# Patient Record
Sex: FEMALE | URBAN - METROPOLITAN AREA
[De-identification: names, ages, dates, MRNs, and addresses within clinical notes are randomized per-mention and may not be internally consistent; named-entity substitution may affect disease eponyms.]

---

## 2020-01-14 ENCOUNTER — APPOINTMENT (RX ONLY)
Dept: URBAN - METROPOLITAN AREA CLINIC 38 | Facility: CLINIC | Age: 40
Setting detail: DERMATOLOGY
End: 2020-01-14

## 2020-01-14 DIAGNOSIS — A51.39 OTHER SECONDARY SYPHILIS OF SKIN: ICD-10-CM

## 2020-01-14 PROCEDURE — 99202 OFFICE O/P NEW SF 15 MIN: CPT

## 2020-01-14 NOTE — HPI: RASH
Is This A New Presentation, Or A Follow-Up?: Rash
Additional History: States it is also causing hair loss.  \\nPt is positive for syphilis.  Finished the penicillin injections on Friday.

## 2020-09-08 ENCOUNTER — APPOINTMENT (RX ONLY)
Dept: URBAN - METROPOLITAN AREA CLINIC 38 | Facility: CLINIC | Age: 40
Setting detail: DERMATOLOGY
End: 2020-09-08

## 2020-09-08 DIAGNOSIS — A51.39 OTHER SECONDARY SYPHILIS OF SKIN: ICD-10-CM

## 2020-09-08 PROCEDURE — ? PRESCRIPTION

## 2020-09-08 PROCEDURE — 99213 OFFICE O/P EST LOW 20 MIN: CPT

## 2020-09-08 PROCEDURE — ? ADDITIONAL NOTES

## 2020-09-08 PROCEDURE — ? COUNSELING

## 2020-09-08 RX ORDER — HYDROCORTISONE 25 MG/G
CREAM TOPICAL
Qty: 1 | Refills: 4 | Status: ERX | COMMUNITY
Start: 2020-09-08

## 2020-09-08 RX ORDER — FLUOCINONIDE 0.5 MG/ML
SOLUTION TOPICAL
Qty: 1 | Refills: 2 | Status: ERX | COMMUNITY
Start: 2020-09-08

## 2020-09-08 RX ORDER — FLUOCINONIDE 0.5 MG/G
OINTMENT TOPICAL
Qty: 1 | Refills: 2 | Status: ERX | COMMUNITY
Start: 2020-09-08

## 2020-09-08 RX ADMIN — HYDROCORTISONE: 25 CREAM TOPICAL at 00:00

## 2020-09-08 RX ADMIN — FLUOCINONIDE: 0.5 OINTMENT TOPICAL at 00:00

## 2020-09-08 RX ADMIN — FLUOCINONIDE: 0.5 SOLUTION TOPICAL at 00:00

## 2020-09-08 ASSESSMENT — LOCATION SIMPLE DESCRIPTION DERM
LOCATION SIMPLE: ABDOMEN
LOCATION SIMPLE: GLABELLA
LOCATION SIMPLE: POSTERIOR NECK

## 2020-09-08 ASSESSMENT — LOCATION DETAILED DESCRIPTION DERM
LOCATION DETAILED: PERIUMBILICAL SKIN
LOCATION DETAILED: GLABELLA
LOCATION DETAILED: MID POSTERIOR NECK

## 2020-09-08 ASSESSMENT — LOCATION ZONE DERM
LOCATION ZONE: FACE
LOCATION ZONE: NECK
LOCATION ZONE: TRUNK

## 2021-05-03 ENCOUNTER — APPOINTMENT (RX ONLY)
Dept: URBAN - METROPOLITAN AREA CLINIC 38 | Facility: CLINIC | Age: 41
Setting detail: DERMATOLOGY
End: 2021-05-03

## 2021-05-03 DIAGNOSIS — L40.0 PSORIASIS VULGARIS: ICD-10-CM

## 2021-05-03 DIAGNOSIS — L30.9 DERMATITIS, UNSPECIFIED: ICD-10-CM

## 2021-05-03 PROCEDURE — ? OTHER

## 2021-05-03 PROCEDURE — ? PRESCRIPTION

## 2021-05-03 PROCEDURE — 99213 OFFICE O/P EST LOW 20 MIN: CPT | Mod: 25

## 2021-05-03 PROCEDURE — ? BIOPSY BY SHAVE METHOD

## 2021-05-03 PROCEDURE — 11102 TANGNTL BX SKIN SINGLE LES: CPT

## 2021-05-03 PROCEDURE — ? ORDER TESTS

## 2021-05-03 RX ORDER — FLUTICASONE PROPIONATE 0.5 MG/G
CREAM TOPICAL
Qty: 60 | Refills: 1 | Status: ERX | COMMUNITY
Start: 2021-05-03

## 2021-05-03 RX ADMIN — FLUTICASONE PROPIONATE: 0.5 CREAM TOPICAL at 00:00

## 2021-05-03 ASSESSMENT — LOCATION DETAILED DESCRIPTION DERM: LOCATION DETAILED: PERIUMBILICAL SKIN

## 2021-05-03 ASSESSMENT — LOCATION ZONE DERM: LOCATION ZONE: TRUNK

## 2021-05-03 ASSESSMENT — LOCATION SIMPLE DESCRIPTION DERM: LOCATION SIMPLE: ABDOMEN

## 2021-05-03 NOTE — PROCEDURE: ORDER TESTS
Billing Type: Third-Party Bill
Expected Date Of Service: 05/03/2021
Performing Laboratory: -[id number]
Bill For Surgical Tray: no

## 2021-05-03 NOTE — PROCEDURE: OTHER
Other (Free Text): Differential diagnosis lupus, sarcoid, psoriasis
Render Risk Assessment In Note?: no
Detail Level: Detailed
Note Text (......Xxx Chief Complaint.): This diagnosis correlates with the

## 2021-05-03 NOTE — PROCEDURE: BIOPSY BY SHAVE METHOD

## 2021-05-25 ENCOUNTER — APPOINTMENT (RX ONLY)
Dept: URBAN - METROPOLITAN AREA CLINIC 38 | Facility: CLINIC | Age: 41
Setting detail: DERMATOLOGY
End: 2021-05-25

## 2021-05-25 DIAGNOSIS — D86.3 SARCOIDOSIS OF SKIN: ICD-10-CM

## 2021-05-25 PROCEDURE — ? ADDITIONAL NOTES

## 2021-05-25 PROCEDURE — 99213 OFFICE O/P EST LOW 20 MIN: CPT

## 2021-05-25 PROCEDURE — ? TREATMENT REGIMEN

## 2021-05-25 PROCEDURE — ? COUNSELING

## 2021-12-07 ENCOUNTER — APPOINTMENT (RX ONLY)
Dept: URBAN - METROPOLITAN AREA CLINIC 34 | Facility: CLINIC | Age: 41
Setting detail: DERMATOLOGY
End: 2021-12-07

## 2021-12-07 DIAGNOSIS — D86.3 SARCOIDOSIS OF SKIN: ICD-10-CM | Status: WORSENING

## 2021-12-07 PROCEDURE — ? COUNSELING

## 2021-12-07 PROCEDURE — 99214 OFFICE O/P EST MOD 30 MIN: CPT

## 2021-12-07 PROCEDURE — ? MEDICATION COUNSELING

## 2021-12-07 PROCEDURE — ? ADDITIONAL NOTES

## 2021-12-07 PROCEDURE — ? ORDER TESTS

## 2021-12-07 PROCEDURE — ? PRESCRIPTION

## 2021-12-07 PROCEDURE — ? TREATMENT REGIMEN

## 2021-12-07 RX ORDER — DESONIDE 0.5 MG/G
OINTMENT TOPICAL
Qty: 60 | Refills: 2 | Status: ERX | COMMUNITY
Start: 2021-12-07

## 2021-12-07 RX ORDER — TRIAMCINOLONE ACETONIDE 1 MG/G
OINTMENT TOPICAL
Qty: 454 | Refills: 2 | Status: ERX | COMMUNITY
Start: 2021-12-07

## 2021-12-07 RX ORDER — PREDNISONE 20 MG/1
TABLET ORAL
Qty: 42 | Refills: 0 | Status: ERX | COMMUNITY
Start: 2021-12-07

## 2021-12-07 RX ADMIN — DESONIDE: 0.5 OINTMENT TOPICAL at 00:00

## 2021-12-07 RX ADMIN — PREDNISONE: 20 TABLET ORAL at 00:00

## 2021-12-07 RX ADMIN — TRIAMCINOLONE ACETONIDE: 1 OINTMENT TOPICAL at 00:00

## 2021-12-07 ASSESSMENT — LOCATION DETAILED DESCRIPTION DERM
LOCATION DETAILED: LEFT INFERIOR FOREHEAD
LOCATION DETAILED: RIGHT INFERIOR FOREHEAD
LOCATION DETAILED: RIGHT POSTERIOR SHOULDER
LOCATION DETAILED: LEFT LATERAL ABDOMEN
LOCATION DETAILED: LEFT SUPERIOR PARIETAL SCALP
LOCATION DETAILED: GLABELLA
LOCATION DETAILED: RIGHT LATERAL ABDOMEN
LOCATION DETAILED: MID-FRONTAL SCALP
LOCATION DETAILED: PERIUMBILICAL SKIN
LOCATION DETAILED: LEFT SUPRAPUBIC SKIN
LOCATION DETAILED: LEFT LATERAL SUPERIOR CHEST
LOCATION DETAILED: RIGHT SUPERIOR FOREHEAD
LOCATION DETAILED: LEFT SUPERIOR UPPER BACK
LOCATION DETAILED: RIGHT CENTRAL PARIETAL SCALP

## 2021-12-07 ASSESSMENT — LOCATION SIMPLE DESCRIPTION DERM
LOCATION SIMPLE: GROIN
LOCATION SIMPLE: LEFT UPPER BACK
LOCATION SIMPLE: RIGHT FOREHEAD
LOCATION SIMPLE: ABDOMEN
LOCATION SIMPLE: SCALP
LOCATION SIMPLE: ANTERIOR SCALP
LOCATION SIMPLE: RIGHT SHOULDER
LOCATION SIMPLE: GLABELLA
LOCATION SIMPLE: LEFT FOREHEAD
LOCATION SIMPLE: CHEST

## 2021-12-07 ASSESSMENT — LOCATION ZONE DERM
LOCATION ZONE: FACE
LOCATION ZONE: TRUNK
LOCATION ZONE: ARM
LOCATION ZONE: SCALP

## 2021-12-07 NOTE — PROCEDURE: ORDER TESTS
Bill For Surgical Tray: no
Billing Type: Third-Party Bill
Expected Date Of Service: 12/07/2021
Performing Laboratory: -[id number]

## 2021-12-07 NOTE — PROCEDURE: TREATMENT REGIMEN
Initiate Treatment: Desonide Ointment BID for face\\nTriamcinolone ointment BID for scalp and body\\nPrednisone 40 mg daily x 2 weeks, then 20 mg daily for an additional two weeks.
Detail Level: Zone
Plan: See a Pulmonologist to rule out lung involvement.  Screening CXR was negative.  ROS normal. \\n\\nBiopsy proven cutaneous sarcoidosis as of May 2021.  Was lost to follow up. \\n\\nSee primary doctor to make them aware. \\n\\nDiscussed steroid side effects and need for long term treatment. \\n\\nBaseline labs. \\n\\nCounseled patient on Condition and expectations.  Severe scalp involvement is noted. \\n\\nHas involvement inside of several tattoos which is common. \\n\\nConsider Plaquenil or other off label oral treatment to taper to

## 2021-12-07 NOTE — PROCEDURE: MEDICATION COUNSELING
DISPLAY PLAN FREE TEXT Azithromycin Counseling:  I discussed with the patient the risks of azithromycin including but not limited to GI upset, allergic reaction, drug rash, diarrhea, and yeast infections.

## 2021-12-07 NOTE — PROCEDURE: ADDITIONAL NOTES
Additional Notes: Chest X-ray normal; done in [place].  Did not ever see pulmonologist.  No family or personal hx of sarcoidosis. \\n\\nPatient denies any shortness of breath, blood sugar or blood pressure issues.\\n\\nOphthalmologist visit today normal \\n\\nPatient aware they need to be managed by PCP, pulmonologist, and dermatology and may need to see a medical center if condition does not improve Additional Notes: Chest X-ray normal; done in [place].  Did not ever see pulmonologist.  No family or personal hx of sarcoidosis. \\n\\nPatient denies any shortness of breath, blood sugar or blood pressure issues.\\n\\nOphthalmologist visit today normal \\n\\nPatient aware they need to be managed by PCP, pulmonologist, and dermatology and may need to see a medical center if condition does not improve

## 2021-12-07 NOTE — PROCEDURE: MEDICATION COUNSELING
0 Doxycycline Counseling:  Patient counseled regarding possible photosensitivity and increased risk for sunburn.  Patient instructed to avoid sunlight, if possible.  When exposed to sunlight, patients should wear protective clothing, sunglasses, and sunscreen.  The patient was instructed to call the office immediately if the following severe adverse effects occur:  hearing changes, easy bruising/bleeding, severe headache, or vision changes.  The patient verbalized understanding of the proper use and possible adverse effects of doxycycline.  All of the patient's questions and concerns were addressed.

## 2022-02-16 ENCOUNTER — APPOINTMENT (RX ONLY)
Dept: URBAN - METROPOLITAN AREA CLINIC 34 | Facility: CLINIC | Age: 42
Setting detail: DERMATOLOGY
End: 2022-02-16

## 2022-02-16 DIAGNOSIS — D86.3 SARCOIDOSIS OF SKIN: ICD-10-CM | Status: IMPROVED

## 2022-02-16 PROCEDURE — ? COUNSELING

## 2022-02-16 PROCEDURE — 99214 OFFICE O/P EST MOD 30 MIN: CPT

## 2022-02-16 PROCEDURE — ? ADDITIONAL NOTES

## 2022-02-16 PROCEDURE — ? PRESCRIPTION

## 2022-02-16 PROCEDURE — ? TREATMENT REGIMEN

## 2022-02-16 PROCEDURE — ? MEDICATION COUNSELING

## 2022-02-16 RX ORDER — TRIAMCINOLONE ACETONIDE 1 MG/G
OINTMENT TOPICAL
Qty: 454 | Refills: 2 | Status: ERX

## 2022-02-16 RX ORDER — KETOCONAZOLE 20 MG/ML
SHAMPOO, SUSPENSION TOPICAL
Qty: 120 | Refills: 3 | Status: ERX | COMMUNITY
Start: 2022-02-16

## 2022-02-16 RX ORDER — DESONIDE 0.5 MG/G
OINTMENT TOPICAL
Qty: 60 | Refills: 2 | Status: ERX

## 2022-02-16 RX ADMIN — KETOCONAZOLE: 20 SHAMPOO, SUSPENSION TOPICAL at 00:00

## 2022-02-16 ASSESSMENT — LOCATION DETAILED DESCRIPTION DERM
LOCATION DETAILED: MID-FRONTAL SCALP
LOCATION DETAILED: LEFT INFERIOR FOREHEAD
LOCATION DETAILED: LEFT LATERAL ABDOMEN
LOCATION DETAILED: GLABELLA
LOCATION DETAILED: PERIUMBILICAL SKIN
LOCATION DETAILED: RIGHT CENTRAL PARIETAL SCALP
LOCATION DETAILED: RIGHT SUPERIOR FOREHEAD
LOCATION DETAILED: RIGHT POSTERIOR SHOULDER
LOCATION DETAILED: RIGHT INFERIOR FOREHEAD
LOCATION DETAILED: LEFT SUPRAPUBIC SKIN
LOCATION DETAILED: RIGHT LATERAL ABDOMEN
LOCATION DETAILED: LEFT SUPERIOR UPPER BACK
LOCATION DETAILED: LEFT SUPERIOR PARIETAL SCALP
LOCATION DETAILED: LEFT LATERAL SUPERIOR CHEST

## 2022-02-16 ASSESSMENT — LOCATION SIMPLE DESCRIPTION DERM
LOCATION SIMPLE: GROIN
LOCATION SIMPLE: LEFT UPPER BACK
LOCATION SIMPLE: RIGHT SHOULDER
LOCATION SIMPLE: SCALP
LOCATION SIMPLE: ABDOMEN
LOCATION SIMPLE: LEFT FOREHEAD
LOCATION SIMPLE: CHEST
LOCATION SIMPLE: RIGHT FOREHEAD
LOCATION SIMPLE: ANTERIOR SCALP
LOCATION SIMPLE: GLABELLA

## 2022-02-16 ASSESSMENT — LOCATION ZONE DERM
LOCATION ZONE: ARM
LOCATION ZONE: SCALP
LOCATION ZONE: TRUNK
LOCATION ZONE: FACE

## 2022-02-16 ASSESSMENT — SEVERITY ASSESSMENT: SEVERITY: SEVERE

## 2022-02-16 NOTE — PROCEDURE: MIPS QUALITY
Detail Level: Detailed
Quality 111:Pneumonia Vaccination Status For Older Adults: Pneumococcal Vaccination not Administered or Previously Received, Reason not Otherwise Specified
Quality 130: Documentation Of Current Medications In The Medical Record: Current Medications Documented
Quality 431: Preventive Care And Screening: Unhealthy Alcohol Use - Screening: Patient not identified as an unhealthy alcohol user when screened for unhealthy alcohol use using a systematic screening method
Quality 226: Preventive Care And Screening: Tobacco Use: Screening And Cessation Intervention: Patient screened for tobacco use and is an ex/non-smoker
Quality 47: Advance Care Plan: Advance care planning not documented, reason not otherwise specified.

## 2022-02-16 NOTE — PROCEDURE: TREATMENT REGIMEN
Continue Regimen: Desonide Ointment BID for face\\nTriamcinolone ointment BID for scalp and body
Discontinue Regimen: Prednisone 40 mg daily x 2 weeks, then 20 mg daily for an additional two weeks.
Initiate Treatment: ketoconazole 2 % shampoo Qwk
Detail Level: Zone
Plan: Pulmonologist to follow up on lung involvement.  Screening CXR was negative.  ROS normal. \\n\\nBiopsy proven cutaneous sarcoidosis as of May 2021.  Was lost to follow up. \\n\\nSee primary doctor to make them aware. \\n\\nDiscussed steroid side effects and need for long term treatment. \\n\\nBaseline labs. \\n\\nCounseled patient on Condition and expectations.  Severe scalp involvement is noted. \\n\\nHas involvement inside of several tattoos which is common. \\n\\nConsider Plaquenil or other off label oral treatment to taper to

## 2022-02-16 NOTE — PROCEDURE: ADDITIONAL NOTES
Additional Notes: Chest X-ray done in [place].  Nodule found on right lung. Pulmonologist recommended CT scan and pulmonary function test. \\n\\nNo family or personal hx of sarcoidosis. \\n\\nPatient denies any shortness of breath, blood sugar or blood pressure issues.\\n\\nPatient aware they need to be managed by PCP, pulmonologist, and dermatology and may need to see a medical center if condition does not improve\\n\\nDiscussed long term oral steroid and steroid injection. Advised to continue with topicals\\n\\nWill consider low dose oral steroid if approved by patient’s pulmonologist. \\n\\nPatient’s pulmonologist:\\n\\[phone] Additional Notes: Chest X-ray done in [place].  Nodule found on right lung. Pulmonologist recommended CT scan and pulmonary function test. \\n\\nNo family or personal hx of sarcoidosis. \\n\\nPatient denies any shortness of breath, blood sugar or blood pressure issues.\\n\\nPatient aware they need to be managed by PCP, pulmonologist, and dermatology and may need to see a medical center if condition does not improve\\n\\nDiscussed long term oral steroid and steroid injection. Advised to continue with topicals\\n\\nWill consider low dose oral steroid if approved by patient’s pulmonologist. \\n\\nPatient’s pulmonologist:\\n\\[phone]

## 2022-04-20 ENCOUNTER — APPOINTMENT (RX ONLY)
Dept: URBAN - METROPOLITAN AREA CLINIC 34 | Facility: CLINIC | Age: 42
Setting detail: DERMATOLOGY
End: 2022-04-20

## 2022-04-20 DIAGNOSIS — L81.0 POSTINFLAMMATORY HYPERPIGMENTATION: ICD-10-CM

## 2022-04-20 DIAGNOSIS — D86.3 SARCOIDOSIS OF SKIN: ICD-10-CM | Status: IMPROVED

## 2022-04-20 PROCEDURE — ? PRESCRIPTION

## 2022-04-20 PROCEDURE — ? SKIN MEDICINALS

## 2022-04-20 PROCEDURE — 99214 OFFICE O/P EST MOD 30 MIN: CPT

## 2022-04-20 PROCEDURE — ? COUNSELING

## 2022-04-20 PROCEDURE — ? TREATMENT REGIMEN

## 2022-04-20 PROCEDURE — ? PRESCRIPTION MEDICATION MANAGEMENT

## 2022-04-20 PROCEDURE — ? ADDITIONAL NOTES

## 2022-04-20 RX ORDER — HYDROXYCHLOROQUINE SULFATE 200 MG/1
TABLET ORAL BID
Qty: 60 | Refills: 2 | Status: ERX | COMMUNITY
Start: 2022-04-20

## 2022-04-20 RX ADMIN — HYDROXYCHLOROQUINE SULFATE: 200 TABLET ORAL at 00:00

## 2022-04-20 ASSESSMENT — LOCATION DETAILED DESCRIPTION DERM
LOCATION DETAILED: LEFT SUPERIOR UPPER BACK
LOCATION DETAILED: RIGHT POSTERIOR SHOULDER
LOCATION DETAILED: RIGHT INFERIOR MEDIAL FOREHEAD
LOCATION DETAILED: RIGHT SUPERIOR FOREHEAD
LOCATION DETAILED: RIGHT LATERAL ABDOMEN
LOCATION DETAILED: LEFT LATERAL ABDOMEN
LOCATION DETAILED: LEFT LATERAL SUPERIOR CHEST
LOCATION DETAILED: RIGHT CENTRAL PARIETAL SCALP
LOCATION DETAILED: PERIUMBILICAL SKIN
LOCATION DETAILED: LEFT SUPERIOR PARIETAL SCALP
LOCATION DETAILED: RIGHT INFERIOR FOREHEAD
LOCATION DETAILED: MID-FRONTAL SCALP
LOCATION DETAILED: LEFT SUPRAPUBIC SKIN
LOCATION DETAILED: LEFT INFERIOR FOREHEAD
LOCATION DETAILED: GLABELLA
LOCATION DETAILED: INFERIOR MID FOREHEAD

## 2022-04-20 ASSESSMENT — LOCATION SIMPLE DESCRIPTION DERM
LOCATION SIMPLE: GROIN
LOCATION SIMPLE: ANTERIOR SCALP
LOCATION SIMPLE: LEFT FOREHEAD
LOCATION SIMPLE: LEFT UPPER BACK
LOCATION SIMPLE: SCALP
LOCATION SIMPLE: GLABELLA
LOCATION SIMPLE: RIGHT FOREHEAD
LOCATION SIMPLE: RIGHT SHOULDER
LOCATION SIMPLE: INFERIOR FOREHEAD
LOCATION SIMPLE: ABDOMEN
LOCATION SIMPLE: CHEST

## 2022-04-20 ASSESSMENT — LOCATION ZONE DERM
LOCATION ZONE: TRUNK
LOCATION ZONE: ARM
LOCATION ZONE: FACE
LOCATION ZONE: SCALP

## 2022-04-20 NOTE — PROCEDURE: ADDITIONAL NOTES
Additional Notes: Lung involvement based on CT scan. Denies trouble breathing.\\n\\nAdvised patient A1C is 11.7, recently prescribed metformin . Liver, kidney and blood count WNL. \\n \\nPulmonologist agrees that patient can start plaquinel as treatment, will try not to prescribe prednisone unless flares are uncontrollable. \\n\\n——————————————————————————————————————————————————————\\n\\nChart notes from 02/16/22 visit: \\n\\nChest X-ray done in [place].  Nodule found on right lung. Pulmonologist recommended CT scan and pulmonary function test. \\n\\nNo family hx of sarcoidosis.   Calcium and ACE level ok.   PFTs normal. \\n\\nPatient denies any shortness of breath, blood sugar or blood pressure issues.\\n\\nPatient aware they need to be managed by PCP, pulmonologist, and dermatology and may need to see a medical center if condition does not improve\\n\\nDiscussed long term oral steroid and steroid injection. Advised to continue with topicals\\n\\nWill consider low dose oral steroid if approved by patient’s pulmonologist. \\n\\nPatient’s pulmonologist:\\n\\[phone]\\n\\nShe is having ophthalmology and cardiology checks q6 months.  Seeing pulmonary q3 months.   Bilateral hilar adenopathy seen on scan. Additional Notes: Lung involvement based on CT scan. Denies trouble breathing.\\n\\nAdvised patient A1C is 11.7, recently prescribed metformin . Liver, kidney and blood count WNL. \\n \\nPulmonologist agrees that patient can start plaquinel as treatment, will try not to prescribe prednisone unless flares are uncontrollable. \\n\\n——————————————————————————————————————————————————————\\n\\nChart notes from 02/16/22 visit: \\n\\nChest X-ray done in [place].  Nodule found on right lung. Pulmonologist recommended CT scan and pulmonary function test. \\n\\nNo family hx of sarcoidosis.   Calcium and ACE level ok.   PFTs normal. \\n\\nPatient denies any shortness of breath, blood sugar or blood pressure issues.\\n\\nPatient aware they need to be managed by PCP, pulmonologist, and dermatology and may need to see a medical center if condition does not improve\\n\\nDiscussed long term oral steroid and steroid injection. Advised to continue with topicals\\n\\nWill consider low dose oral steroid if approved by patient’s pulmonologist. \\n\\nPatient’s pulmonologist:\\n\\[phone]\\n\\nShe is having ophthalmology and cardiology checks q6 months.  Seeing pulmonary q3 months.   Bilateral hilar adenopathy seen on scan.

## 2022-04-20 NOTE — PROCEDURE: TREATMENT REGIMEN
Continue Regimen: Desonide Ointment BID for face\\nTriamcinolone ointment BID for scalp and body\\nketoconazole 2 % shampoo Qwk
Discontinue Regimen: Previously completed four week steroid taper with significant improvement.
Initiate Treatment: Plaquenil 200 mg bid
Detail Level: Zone
Plan: Pulmonologist to follow up on lung involvement.  Screening CXR was negative.  ROS normal. \\n\\nBiopsy proven cutaneous sarcoidosis as of May 2021.  Was lost to follow up. \\n\\nPCP is aware. \\n\\nPulmonary states she is fine from pulmonary standpoint snd wants us to manage her Plaquenil.  Agrees with starting.  Baseline labs normal.  Recheck in three months. \\n\\nGiven poorly controlled diabetes we will try to avoid po steroids.  \\n\\nCounseled patient on Condition and expectations.  Severe scalp involvement is noted. \\n\\nHas involvement inside of several tattoos which is common. \\n\\nOverall improved.  Ok to do bleaching cream for plaque on face.\\n\\nRecheck labs in three months.
Initiate Treatment: HQ compound with Tretinoin from  nightly x 3 months.

## 2022-04-20 NOTE — PROCEDURE: SKIN MEDICINALS
Sig: Apply to affected areas twice daily
Sig: Apply nightly to warts nightly under occlusion
Sig: Wash affected areas daily.
Sig: Apply a thin layer to affected areas twice daily for 6 weeks
Detail Level: Simple
Sig: Apply a thin layer to the scar daily
Sig: Apply pea sized amount per area at night
Sig: Apply twice daily for 5 days
Intro Statement: I recommended the following products:
Sig: Apply a thin layer to the affected skin twice daily
Sig: Apply a thin layer to the affected areas daily
Sig: Apply to affected areas on face twice daily
Product Type (1): Lightening Cream
Sig: Apply a thin layer to the affected areas twice daily
Lightening Cream: Hydroquinone 8%, Tretinoin 0.025%, Kojic Acid 1%, Niacinamide 4%, Fluocinolone 0.025% Cream

## 2022-09-12 ENCOUNTER — RX ONLY (OUTPATIENT)
Age: 42
Setting detail: RX ONLY
End: 2022-09-12

## 2022-09-12 RX ORDER — DESONIDE 0.5 MG/G
OINTMENT TOPICAL
Qty: 60 | Refills: 0 | Status: ERX

## 2022-09-15 ENCOUNTER — RX ONLY (OUTPATIENT)
Age: 42
Setting detail: RX ONLY
End: 2022-09-15

## 2022-09-15 RX ORDER — TRIAMCINOLONE ACETONIDE 1 MG/G
OINTMENT TOPICAL
Qty: 454 | Refills: 0 | Status: ERX

## 2022-12-13 NOTE — PROCEDURE: MEDICATION COUNSELING
Left another message.   Finasteride Pregnancy And Lactation Text: This medication is absolutely contraindicated during pregnancy. It is unknown if it is excreted in breast milk.

## 2022-12-19 ENCOUNTER — APPOINTMENT (RX ONLY)
Dept: URBAN - METROPOLITAN AREA CLINIC 34 | Facility: CLINIC | Age: 42
Setting detail: DERMATOLOGY
End: 2022-12-19

## 2022-12-19 ENCOUNTER — RX ONLY (OUTPATIENT)
Age: 42
Setting detail: RX ONLY
End: 2022-12-19

## 2022-12-19 DIAGNOSIS — L30.4 ERYTHEMA INTERTRIGO: ICD-10-CM

## 2022-12-19 DIAGNOSIS — D86.3 SARCOIDOSIS OF SKIN: ICD-10-CM | Status: INADEQUATELY CONTROLLED

## 2022-12-19 PROCEDURE — ? ADDITIONAL NOTES

## 2022-12-19 PROCEDURE — ? TREATMENT REGIMEN

## 2022-12-19 PROCEDURE — ? PRESCRIPTION

## 2022-12-19 PROCEDURE — ? COUNSELING

## 2022-12-19 PROCEDURE — 99214 OFFICE O/P EST MOD 30 MIN: CPT

## 2022-12-19 PROCEDURE — ? PRESCRIPTION MEDICATION MANAGEMENT

## 2022-12-19 RX ORDER — KETOCONAZOLE 20 MG/ML
SHAMPOO, SUSPENSION TOPICAL
Qty: 120 | Refills: 3 | Status: ERX

## 2022-12-19 RX ORDER — HYDROXYCHLOROQUINE SULFATE 200 MG/1
TABLET ORAL BID
Qty: 60 | Refills: 2 | Status: ERX

## 2022-12-19 RX ORDER — NYSTATIN 100000 [USP'U]/G
OINTMENT TOPICAL BID
Qty: 30 | Refills: 2 | Status: ERX | COMMUNITY
Start: 2022-12-19

## 2022-12-19 RX ORDER — TRIAMCINOLONE ACETONIDE 1 MG/G
OINTMENT TOPICAL
Qty: 454 | Refills: 0 | Status: ERX

## 2022-12-19 RX ADMIN — NYSTATIN: 100000 OINTMENT TOPICAL at 00:00

## 2022-12-19 ASSESSMENT — LOCATION SIMPLE DESCRIPTION DERM
LOCATION SIMPLE: SCALP
LOCATION SIMPLE: RIGHT SHOULDER
LOCATION SIMPLE: LEFT FOREHEAD
LOCATION SIMPLE: ABDOMEN
LOCATION SIMPLE: GROIN
LOCATION SIMPLE: RIGHT FOREHEAD
LOCATION SIMPLE: ANTERIOR SCALP
LOCATION SIMPLE: CHEST
LOCATION SIMPLE: GLABELLA
LOCATION SIMPLE: LEFT UPPER BACK

## 2022-12-19 ASSESSMENT — LOCATION DETAILED DESCRIPTION DERM
LOCATION DETAILED: LEFT SUPRAPUBIC SKIN
LOCATION DETAILED: PERIUMBILICAL SKIN
LOCATION DETAILED: MID-FRONTAL SCALP
LOCATION DETAILED: LEFT INFERIOR FOREHEAD
LOCATION DETAILED: RIGHT CENTRAL PARIETAL SCALP
LOCATION DETAILED: RIGHT INFERIOR FOREHEAD
LOCATION DETAILED: LEFT SUPERIOR PARIETAL SCALP
LOCATION DETAILED: GLABELLA
LOCATION DETAILED: LEFT SUPERIOR UPPER BACK
LOCATION DETAILED: LEFT LATERAL ABDOMEN
LOCATION DETAILED: RIGHT SUPERIOR FOREHEAD
LOCATION DETAILED: RIGHT POSTERIOR SHOULDER
LOCATION DETAILED: LEFT LATERAL SUPERIOR CHEST
LOCATION DETAILED: RIGHT LATERAL ABDOMEN

## 2022-12-19 ASSESSMENT — LOCATION ZONE DERM
LOCATION ZONE: FACE
LOCATION ZONE: ARM
LOCATION ZONE: SCALP
LOCATION ZONE: TRUNK

## 2022-12-19 NOTE — PROCEDURE: ADDITIONAL NOTES
Additional Notes: Pt recently had blood work done with PCP. Received referral for pulmonologist. \\n\\nInformed pt to forward bloodwork. \\n\\nDiscussed oral steroid or plaquenil. Informed pt oral steroid may increase A1C and BP. Pt would like to start plaquenil\\n\\nNeeds blood work done in 3 months \\n\\nInformed pt to follow up with eye doctor \\n\\n\\n————————————————————————————-\\n4/20/22 chart notes \\n\\nLung involvement based on CT scan. Denies trouble breathing.\\n\\nAdvised patient A1C is 11.7, recently prescribed metformin . Liver, kidney and blood count WNL. \\n \\nPulmonologist agrees that patient can start plaquinel as treatment, will try not to prescribe prednisone unless flares are uncontrollable. \\n\\n————————————————————————————————————————————\\n\\nChart notes from 02/16/22 visit: \\n\\nChest X-ray done in [place].  Nodule found on right lung. Pulmonologist recommended CT scan and pulmonary function test. \\n\\nNo family hx of sarcoidosis.   Calcium and ACE level ok.   PFTs normal. \\n\\nPatient denies any shortness of breath, blood sugar or blood pressure issues.\\n\\nPatient aware they need to be managed by PCP, pulmonologist, and dermatology and may need to see a medical center if condition does not improve\\n\\nDiscussed long term oral steroid and steroid injection. Advised to continue with topicals\\n\\nWill consider low dose oral steroid if approved by patient’s pulmonologist. \\n\\nPatient’s pulmonologist:\\n\\[phone]\\n\\nShe is having ophthalmology and cardiology checks q6 months.  Seeing pulmonary q3 months.   Bilateral hilar adenopathy seen on scan. Additional Notes: Pt recently had blood work done with PCP. Received referral for pulmonologist. \\n\\nInformed pt to forward bloodwork. \\n\\nDiscussed oral steroid or plaquenil. Informed pt oral steroid may increase A1C and BP. Pt would like to start plaquenil\\n\\nNeeds blood work done in 3 months \\n\\nInformed pt to follow up with eye doctor \\n\\n\\n————————————————————————————-\\n4/20/22 chart notes \\n\\nLung involvement based on CT scan. Denies trouble breathing.\\n\\nAdvised patient A1C is 11.7, recently prescribed metformin . Liver, kidney and blood count WNL. \\n \\nPulmonologist agrees that patient can start plaquinel as treatment, will try not to prescribe prednisone unless flares are uncontrollable. \\n\\n————————————————————————————————————————————\\n\\nChart notes from 02/16/22 visit: \\n\\nChest X-ray done in [place].  Nodule found on right lung. Pulmonologist recommended CT scan and pulmonary function test. \\n\\nNo family hx of sarcoidosis.   Calcium and ACE level ok.   PFTs normal. \\n\\nPatient denies any shortness of breath, blood sugar or blood pressure issues.\\n\\nPatient aware they need to be managed by PCP, pulmonologist, and dermatology and may need to see a medical center if condition does not improve\\n\\nDiscussed long term oral steroid and steroid injection. Advised to continue with topicals\\n\\nWill consider low dose oral steroid if approved by patient’s pulmonologist. \\n\\nPatient’s pulmonologist:\\n\\[phone]\\n\\nShe is having ophthalmology and cardiology checks q6 months.  Seeing pulmonary q3 months.   Bilateral hilar adenopathy seen on scan.

## 2022-12-19 NOTE — PROCEDURE: PRESCRIPTION MEDICATION MANAGEMENT
Initiate Treatment: Triamcinolone + nystatin 100,000 unit/gram topical ointment Bid
Detail Level: Zone
Render In Strict Bullet Format?: No

## 2023-03-27 ENCOUNTER — RX ONLY (OUTPATIENT)
Age: 43
Setting detail: RX ONLY
End: 2023-03-27

## 2023-03-27 ENCOUNTER — APPOINTMENT (RX ONLY)
Dept: URBAN - METROPOLITAN AREA CLINIC 34 | Facility: CLINIC | Age: 43
Setting detail: DERMATOLOGY
End: 2023-03-27

## 2023-03-27 DIAGNOSIS — L30.4 ERYTHEMA INTERTRIGO: ICD-10-CM

## 2023-03-27 DIAGNOSIS — D86.3 SARCOIDOSIS OF SKIN: ICD-10-CM

## 2023-03-27 DIAGNOSIS — L81.0 POSTINFLAMMATORY HYPERPIGMENTATION: ICD-10-CM

## 2023-03-27 PROCEDURE — ? ORDER TESTS

## 2023-03-27 PROCEDURE — ? PRESCRIPTION MEDICATION MANAGEMENT

## 2023-03-27 PROCEDURE — ? PRESCRIPTION

## 2023-03-27 PROCEDURE — ? COUNSELING

## 2023-03-27 PROCEDURE — 99214 OFFICE O/P EST MOD 30 MIN: CPT

## 2023-03-27 PROCEDURE — ? SKIN MEDICINALS

## 2023-03-27 PROCEDURE — ? ADDITIONAL NOTES

## 2023-03-27 PROCEDURE — ? TREATMENT REGIMEN

## 2023-03-27 RX ORDER — HYDROXYCHLOROQUINE SULFATE 200 MG/1
TABLET ORAL BID
Qty: 60 | Refills: 2 | Status: ERX

## 2023-03-27 RX ORDER — KETOCONAZOLE 20 MG/ML
SHAMPOO, SUSPENSION TOPICAL
Qty: 120 | Refills: 3 | Status: ERX

## 2023-03-27 RX ORDER — DESONIDE 0.5 MG/G
OINTMENT TOPICAL
Qty: 60 | Refills: 2 | Status: ERX

## 2023-03-27 RX ORDER — TRIAMCINOLONE ACETONIDE 1 MG/G
OINTMENT TOPICAL
Qty: 454 | Refills: 0 | Status: ERX

## 2023-03-27 RX ORDER — NYSTATIN 100000 [USP'U]/G
OINTMENT TOPICAL BID
Qty: 30 | Refills: 2 | Status: ERX

## 2023-03-27 ASSESSMENT — LOCATION DETAILED DESCRIPTION DERM
LOCATION DETAILED: LEFT SUPERIOR UPPER BACK
LOCATION DETAILED: RIGHT INFERIOR FOREHEAD
LOCATION DETAILED: PERIUMBILICAL SKIN
LOCATION DETAILED: LEFT SUPERIOR PARIETAL SCALP
LOCATION DETAILED: RIGHT LATERAL ABDOMEN
LOCATION DETAILED: LEFT SUPRAPUBIC SKIN
LOCATION DETAILED: RIGHT CENTRAL PARIETAL SCALP
LOCATION DETAILED: LEFT LATERAL ABDOMEN
LOCATION DETAILED: INFERIOR MID FOREHEAD
LOCATION DETAILED: RIGHT INFERIOR MEDIAL FOREHEAD
LOCATION DETAILED: LEFT INFERIOR FOREHEAD
LOCATION DETAILED: GLABELLA
LOCATION DETAILED: RIGHT POSTERIOR SHOULDER
LOCATION DETAILED: RIGHT SUPERIOR FOREHEAD
LOCATION DETAILED: MID-FRONTAL SCALP
LOCATION DETAILED: LEFT LATERAL SUPERIOR CHEST

## 2023-03-27 ASSESSMENT — LOCATION SIMPLE DESCRIPTION DERM
LOCATION SIMPLE: RIGHT FOREHEAD
LOCATION SIMPLE: ABDOMEN
LOCATION SIMPLE: INFERIOR FOREHEAD
LOCATION SIMPLE: GLABELLA
LOCATION SIMPLE: ANTERIOR SCALP
LOCATION SIMPLE: GROIN
LOCATION SIMPLE: LEFT UPPER BACK
LOCATION SIMPLE: CHEST
LOCATION SIMPLE: SCALP
LOCATION SIMPLE: RIGHT SHOULDER
LOCATION SIMPLE: LEFT FOREHEAD

## 2023-03-27 ASSESSMENT — LOCATION ZONE DERM
LOCATION ZONE: SCALP
LOCATION ZONE: TRUNK
LOCATION ZONE: ARM
LOCATION ZONE: FACE

## 2023-03-27 NOTE — PROCEDURE: ORDER TESTS
Lab Facility: [id number]
Bill For Surgical Tray: no
Expected Date Of Service: 03/27/2023
Billing Type: Third-Party Bill
Performing Laboratory: -[id number]

## 2023-03-27 NOTE — PROCEDURE: ADDITIONAL NOTES
Additional Notes: Pt states hair has grown back \\n\\nPt notes breathing has improved. Uses inhaler occasionally \\n\\nConsider cellcept or methotrexate \\n\\nDue for labs. Lab slip provided today \\n\\n\\n\\nDec 19, 2022 chart notes \\nPt recently had blood work done with PCP. Received referral for pulmonologist. \\n\\nInformed pt to forward bloodwork. \\n\\nDiscussed oral steroid or plaquenil. Informed pt oral steroid may increase A1C and BP. Pt would like to start plaquenil\\n\\nNeeds blood work done in 3 months \\n\\nInformed pt to follow up with eye doctor \\n\\n\\n\\n\\n4/20/22 chart notes \\n\\nLung involvement based on CT scan. Denies trouble breathing.\\n\\nAdvised patient A1C is 11.7, recently prescribed metformin . Liver, kidney and blood count WNL. \\n \\nPulmonologist agrees that patient can start plaquinel as treatment, will try not to prescribe prednisone unless flares are uncontrollable. \\n\\n\\n\\n\\n\\nChart notes from 02/16/22 visit: \\n\\nChest X-ray done in [place].  Nodule found on right lung. Pulmonologist recommended CT scan and pulmonary function test. \\n\\nNo family hx of sarcoidosis.   Calcium and ACE level ok.   PFTs normal. \\n\\nPatient denies any shortness of breath, blood sugar or blood pressure issues.\\n\\nPatient aware they need to be managed by PCP, pulmonologist, and dermatology and may need to see a medical center if condition does not improve\\n\\nDiscussed long term oral steroid and steroid injection. Advised to continue with topicals\\n\\nWill consider low dose oral steroid if approved by patient?s pulmonologist. \\n\\nPatient?s pulmonologist:\\n\\[phone]\\n\\nShe is having ophthalmology and cardiology checks q6 months.  Seeing pulmonary q3 months.   Bilateral hilar adenopathy seen on scan. Additional Notes: Pt states hair has grown back \\n\\nPt notes breathing has improved. Uses inhaler occasionally \\n\\nConsider cellcept or methotrexate \\n\\nDue for labs. Lab slip provided today \\n\\n\\n\\nDec 19, 2022 chart notes \\nPt recently had blood work done with PCP. Received referral for pulmonologist. \\n\\nInformed pt to forward bloodwork. \\n\\nDiscussed oral steroid or plaquenil. Informed pt oral steroid may increase A1C and BP. Pt would like to start plaquenil\\n\\nNeeds blood work done in 3 months \\n\\nInformed pt to follow up with eye doctor \\n\\n\\n\\n\\n4/20/22 chart notes \\n\\nLung involvement based on CT scan. Denies trouble breathing.\\n\\nAdvised patient A1C is 11.7, recently prescribed metformin . Liver, kidney and blood count WNL. \\n \\nPulmonologist agrees that patient can start plaquinel as treatment, will try not to prescribe prednisone unless flares are uncontrollable. \\n\\n\\n\\n\\n\\nChart notes from 02/16/22 visit: \\n\\nChest X-ray done in [place].  Nodule found on right lung. Pulmonologist recommended CT scan and pulmonary function test. \\n\\nNo family hx of sarcoidosis.   Calcium and ACE level ok.   PFTs normal. \\n\\nPatient denies any shortness of breath, blood sugar or blood pressure issues.\\n\\nPatient aware they need to be managed by PCP, pulmonologist, and dermatology and may need to see a medical center if condition does not improve\\n\\nDiscussed long term oral steroid and steroid injection. Advised to continue with topicals\\n\\nWill consider low dose oral steroid if approved by patient?s pulmonologist. \\n\\nPatient?s pulmonologist:\\n\\[phone]\\n\\nShe is having ophthalmology and cardiology checks q6 months.  Seeing pulmonary q3 months.   Bilateral hilar adenopathy seen on scan.

## 2023-03-27 NOTE — PROCEDURE: TREATMENT REGIMEN
Continue Regimen: Plaquenil 200 mg bid\\n\\nDesonide Ointment BID for face\\nTriamcinolone ointment BID for scalp and body\\nketoconazole 2 % shampoo Qwk
Detail Level: Zone
Plan: Pulmonologist to follow up on lung involvement.  Screening CXR was negative.  ROS normal. \\n\\nBiopsy proven cutaneous sarcoidosis as of May 2021.  Was lost to follow up. \\n\\nPCP is aware. \\n\\nPulmonary states she is fine from pulmonary standpoint snd wants us to manage her Plaquenil.  Agrees with starting.  Baseline labs normal.  Recheck in three months. \\n\\nGiven poorly controlled diabetes we will try to avoid po steroids.  \\n\\nCounseled patient on Condition and expectations.  Severe scalp involvement is noted. \\n\\nHas involvement inside of several tattoos which is common. \\n\\nOverall improved.  Ok to do bleaching cream for plaque on face.\\n\\nRecheck labs in three months.
Initiate Treatment: HQ compound with Tretinoin from  nightly x 3 months.